# Patient Record
Sex: MALE | Race: WHITE | NOT HISPANIC OR LATINO | Employment: FULL TIME | URBAN - NONMETROPOLITAN AREA
[De-identification: names, ages, dates, MRNs, and addresses within clinical notes are randomized per-mention and may not be internally consistent; named-entity substitution may affect disease eponyms.]

---

## 2021-05-28 ENCOUNTER — HOSPITAL ENCOUNTER (EMERGENCY)
Facility: OTHER | Age: 36
Discharge: HOME | End: 2021-05-28
Attending: STUDENT IN AN ORGANIZED HEALTH CARE EDUCATION/TRAINING PROGRAM | Admitting: STUDENT IN AN ORGANIZED HEALTH CARE EDUCATION/TRAINING PROGRAM
Payer: COMMERCIAL

## 2021-05-28 VITALS
TEMPERATURE: 97.4 F | HEIGHT: 70 IN | OXYGEN SATURATION: 98 % | HEART RATE: 64 BPM | WEIGHT: 185 LBS | BODY MASS INDEX: 26.48 KG/M2 | SYSTOLIC BLOOD PRESSURE: 141 MMHG | RESPIRATION RATE: 15 BRPM | DIASTOLIC BLOOD PRESSURE: 79 MMHG

## 2021-05-28 DIAGNOSIS — S05.02XA ABRASION OF LEFT CORNEA, INITIAL ENCOUNTER: ICD-10-CM

## 2021-05-28 DIAGNOSIS — T15.92XA ACUTE FOREIGN BODY OF LEFT EYE, INITIAL ENCOUNTER: ICD-10-CM

## 2021-05-28 PROCEDURE — 99283 EMERGENCY DEPT VISIT LOW MDM: CPT | Performed by: STUDENT IN AN ORGANIZED HEALTH CARE EDUCATION/TRAINING PROGRAM

## 2021-05-28 RX ORDER — POLYMYXIN B SULFATE AND TRIMETHOPRIM 1; 10000 MG/ML; [USP'U]/ML
2 SOLUTION OPHTHALMIC EVERY 4 HOURS
Qty: 6 ML | Refills: 0 | Status: SHIPPED | OUTPATIENT
Start: 2021-05-28 | End: 2021-06-07

## 2021-05-28 RX ORDER — TETRACAINE HYDROCHLORIDE 5 MG/ML
1-2 SOLUTION OPHTHALMIC ONCE
Status: DISCONTINUED | OUTPATIENT
Start: 2021-05-28 | End: 2021-05-28 | Stop reason: HOSPADM

## 2021-05-28 ASSESSMENT — MIFFLIN-ST. JEOR: SCORE: 1790.4

## 2021-05-29 NOTE — ED PROVIDER NOTES
"  History     Chief Complaint   Patient presents with     eye irritation     HPI  Aaron Gómez is a 35 year old male who is otherwise healthy with no significant medical history presenting with acute left eye irritation.  Patient states he was driving with the window down earlier and felt something go into his eye.  This occurred about 3 hours ago.  He tried rubbing his eye without any success.  He states he feels like there is something in his eye.  He does note some eye redness.  He otherwise states that his vision is completely normal.  He denies any swelling or redness around the eye.  No other complaints, no fevers or chills, nausea or vomiting, no headaches.    Allergies:  No Known Allergies    Problem List:    There are no active problems to display for this patient.       Past Medical History:    History reviewed. No pertinent past medical history.    Past Surgical History:    History reviewed. No pertinent surgical history.    Family History:    History reviewed. No pertinent family history.    Social History:  Marital Status:    Social History     Tobacco Use     Smoking status: Never Smoker     Smokeless tobacco: Never Used   Substance Use Topics     Alcohol use: Yes     Comment: occasionally     Drug use: Never        Medications:    trimethoprim-polymyxin b (POLYTRIM) 95136-1.1 UNIT/ML-% ophthalmic solution          Review of Systems  Please see HPI above for pertinent positives and negatives.  All other systems reviewed and found to be negative.    Physical Exam   BP: (!) 141/79  Pulse: 64  Temp: 97.4  F (36.3  C)  Resp: 15  Height: 177.8 cm (5' 10\")  Weight: 83.9 kg (185 lb)  SpO2: 98 %      Physical Exam  Gen: Lying in bed, no acute distress but in uncomfortable, alert  HEENT: NC/AT, MMM. Mild conjunctival injection on the left; punctate brown foreign body near left lower limbic border on the left, not overlying fovea. Fluorescein staining with adjacent small corneal abrasion surrounding FB.  CV: " RRR, appears warm and well-perfused  Pulm: CTAB, normal respiratory effort  Skin: No rash or other lesions  Neuro: A&O x4, no focal deficits, CN II-XII grossly intact, PERRL, EOMI. Gross vision intact, symmetric.    ED Course     ED Course as of May 28 2211   Fri May 28, 2021   2105 Patient evaluated, appears to have small punctate foreign body near left lower limbic border of the left eye. Vision normal.      2114 Tetracaine and fluorescein applied, will try gentle irrigation with saline to remove FB, if unsuccessful will try q-tip      2141 Able to remove most of FB but very punctate focus remains; risk of injury from further attempts at removal outweighs benefits, will prescribe polytrim for small adjacent corneal abrasion and refer to ophthalmology, careful return precautions provided        Procedures               Critical Care time:  none               No results found for this or any previous visit (from the past 24 hour(s)).    Medications   tetracaine (PONTOCAINE) 0.5 % ophthalmic solution 1-2 drop (has no administration in time range)   fluorescein (FUL-EUGENE) ophthalmic strip 1 strip (has no administration in time range)       Assessments & Plan (with Medical Decision Making)   35-year-old man who is otherwise healthy presents with acute left eye pain and irritation and foreign body sensation.  Vitally stable, afebrile.  Examined as above, gross vision intact and symmetric compared to contralateral eye, patient does have mild conjunctival injection and evidence of a punctate foreign body near the left lower limbic border.  Attempt at removal with saline irrigation followed by Q-tip was mostly successful with only residual punctate area remaining patient uncomfortable with further removal attempts and I do not feel that the benefits of complete removal at this time outweigh risks of further injury to the cornea.  Patient is appropriate for discharge with close eye clinic follow-up early next week, Polytrim  eyedrops for adjacent corneal abrasion to the punctate foreign body remaining, careful ED return precautions provided.    From ED discharge instructions:  You have a small foreign body in your left eye. Most of this was removed at the bedside but a small amount remains. You also have a small corneal abrasion (scratch on the surface of your eye).    Start using polytrim eye drops every 4 hours as prescribed starting tonight.    Follow up in eye clinic as soon as possible (try calling tomorrow to see if any weekend availability, otherwise call first thing Tuesday morning) to be seen urgently same day.    Come back to the emergency department if worsening eye pain or swelling, fever, loss of vision, or any other acute concerns.      I have reviewed the nursing notes.    I have reviewed the findings, diagnosis, plan and need for follow up with the patient.       Discharge Medication List as of 5/28/2021  9:32 PM      START taking these medications    Details   trimethoprim-polymyxin b (POLYTRIM) 72691-4.1 UNIT/ML-% ophthalmic solution Place 2 drops Into the left eye every 4 hours for 10 days, Disp-6 mL, R-0, E-Prescribe             Final diagnoses:   Acute foreign body of left eye, initial encounter   Abrasion of left cornea, initial encounter       5/28/2021   [facility] Sean Ni MD  05/29/21 0200

## 2021-05-29 NOTE — DISCHARGE INSTRUCTIONS
You have a small foreign body in your left eye. Most of this was removed at the bedside but a small amount remains. You also have a small corneal abrasion (scratch on the surface of your eye).    Start using polytrim eye drops every 4 hours as prescribed starting tonight.    Follow up in eye clinic as soon as possible (try calling tomorrow to see if any weekend availability, otherwise call first thing Tuesday morning) to be seen urgently same day.    Come back to the emergency department if worsening eye pain or swelling, fever, loss of vision, or any other acute concerns.

## 2021-05-29 NOTE — ED TRIAGE NOTES
"ED Nursing Triage Note (General)   ________________________________    Aaron D Rico is a 35 year old Male that presents to triage private car  With history of  Eye irritation after driving with the windows open reported by patient   Significant symptoms had onset 3 hour(s) ago.  BP (!) 141/79   Pulse 64   Temp 97.4  F (36.3  C) (Tympanic)   Resp 15   Ht 1.778 m (5' 10\")   Wt 83.9 kg (185 lb)   SpO2 98%   BMI 26.54 kg/m  t  Patient appears alert , in moderate distress., and cooperative behavior.    GCS Total = 15  Airway: intact  Breathing noted as Normal  Circulation Normal  Skin:  Normal  Action taken:  To waiting room      PRE HOSPITAL PRIOR LIVING SITUATION Spouse  "

## 2024-01-05 ENCOUNTER — OFFICE VISIT (OUTPATIENT)
Dept: OCCUPATIONAL MEDICINE | Age: 39
End: 2024-01-05

## 2024-01-05 DIAGNOSIS — Z00.8 HEALTH EXAMINATION IN POPULATION SURVEY: Primary | ICD-10-CM

## 2024-01-05 PROCEDURE — OH112 RAPID TEST DRUG SCREEN COLLECTION: Performed by: NURSE PRACTITIONER

## 2024-01-05 PROCEDURE — OH046 BREATH ALCOHOL TEST BUNDLED: Performed by: NURSE PRACTITIONER

## (undated) RX ORDER — TETRACAINE HYDROCHLORIDE 5 MG/ML
SOLUTION OPHTHALMIC
Status: DISPENSED
Start: 2021-05-28